# Patient Record
Sex: FEMALE | Race: WHITE | NOT HISPANIC OR LATINO | Employment: FULL TIME | URBAN - METROPOLITAN AREA
[De-identification: names, ages, dates, MRNs, and addresses within clinical notes are randomized per-mention and may not be internally consistent; named-entity substitution may affect disease eponyms.]

---

## 2018-04-05 ENCOUNTER — OFFICE VISIT (OUTPATIENT)
Dept: OBSTETRICS AND GYNECOLOGY | Facility: CLINIC | Age: 38
End: 2018-04-05

## 2018-04-05 VITALS
WEIGHT: 192 LBS | BODY MASS INDEX: 30.86 KG/M2 | SYSTOLIC BLOOD PRESSURE: 108 MMHG | DIASTOLIC BLOOD PRESSURE: 80 MMHG | HEIGHT: 66 IN

## 2018-04-05 DIAGNOSIS — Z13.9 SCREENING FOR CONDITION: ICD-10-CM

## 2018-04-05 DIAGNOSIS — Z11.51 SPECIAL SCREENING EXAMINATION FOR HUMAN PAPILLOMAVIRUS (HPV): ICD-10-CM

## 2018-04-05 DIAGNOSIS — Z01.419 PAP SMEAR, LOW-RISK: ICD-10-CM

## 2018-04-05 DIAGNOSIS — R63.5 WEIGHT GAIN: ICD-10-CM

## 2018-04-05 DIAGNOSIS — Z01.419 ENCOUNTER FOR GYNECOLOGICAL EXAMINATION WITHOUT ABNORMAL FINDING: Primary | ICD-10-CM

## 2018-04-05 LAB

## 2018-04-05 PROCEDURE — 81025 URINE PREGNANCY TEST: CPT | Performed by: OBSTETRICS & GYNECOLOGY

## 2018-04-05 PROCEDURE — 81002 URINALYSIS NONAUTO W/O SCOPE: CPT | Performed by: OBSTETRICS & GYNECOLOGY

## 2018-04-05 PROCEDURE — 99395 PREV VISIT EST AGE 18-39: CPT | Performed by: OBSTETRICS & GYNECOLOGY

## 2018-04-05 RX ORDER — MULTIVIT AND MINERALS-FERROUS GLUCONATE 9 MG IRON/15 ML ORAL LIQUID 9 MG/15 ML
LIQUID (ML) ORAL DAILY
COMMUNITY
End: 2022-09-15

## 2018-04-05 RX ORDER — VILAZODONE HYDROCHLORIDE 20 MG/1
20 TABLET ORAL DAILY
COMMUNITY
End: 2022-09-15

## 2018-04-05 NOTE — PROGRESS NOTES
"GYN Annual Exam     CC- Here for annual exam.     Richelle Sebastian is a 37 y.o. female who presents for annual well woman exam. Periods are regular every 28-30 days, lasting 5 days. Dysmenorrhea:none. Cyclic symptoms include none. No intermenstrual bleeding, spotting, or discharge.  Patient is sexually active  not currently . Patient is satisfied with her contraception no. Pt gained 30 pounds since she quit smoking over 6 weeks. Over last couple of years another 20 pounds.    OB History     No data available          Current contraception: none  History of abnormal Pap smear: yes - remote  History of abnormal mammogram: yes: breast biopsy benign  Family history of uterine, colon or ovarian cancer: no  Family history of breast cancer: yes. Sister and maternal and paternal GM. Pt's sister is BRCA negative?    Health Maintenance   Topic Date Due   • PNEUMOCOCCAL VACCINE (19-64 MEDIUM RISK) (1 of 1 - PPSV23) 11/12/1998   • TDAP/TD VACCINES (1 - Tdap) 11/12/1998   • INFLUENZA VACCINE  08/01/2018   • PAP SMEAR  04/05/2021       History reviewed. No pertinent past medical history.    No past surgical history on file.      Current Outpatient Prescriptions:   •  Multiple Vitamins-Minerals (MULTIVITAMIN AND MINERALS) liquid liquid, Take  by mouth Daily., Disp: , Rfl:   •  vilazodone (VIIBRYD) 20 MG tablet tablet, Take 20 mg by mouth Daily., Disp: , Rfl:     No Known Allergies    Social History   Substance Use Topics   • Smoking status: Current Every Day Smoker     Packs/day: 0.50     Years: 20.00   • Smokeless tobacco: Never Used   • Alcohol use No       Family History   Problem Relation Age of Onset   • Cancer Father    • Cancer Sister    • Cancer Maternal Grandmother    • Cancer Paternal Grandmother        Review of Systems    /80   Ht 167.6 cm (66\")   Wt 87.1 kg (192 lb)   LMP 03/29/2018   BMI 30.99 kg/m²     Physical Exam   Constitutional: She is oriented to person, place, and time. She appears well-developed " and well-nourished.   HENT:   Mouth/Throat: Normal dentition. No dental caries.   Cardiovascular: Normal rate and regular rhythm.    Pulmonary/Chest: Effort normal and breath sounds normal. Right breast exhibits no inverted nipple, no mass, no nipple discharge, no skin change and no tenderness. Left breast exhibits no inverted nipple, no mass, no nipple discharge, no skin change and no tenderness.   Breast reduction   Abdominal: Soft. She exhibits no distension and no mass. There is no tenderness.   Genitourinary: There is no rash, tenderness or lesion on the right labia. There is no rash, tenderness or lesion on the left labia. Uterus is not deviated, not enlarged, not fixed and not tender. Cervix exhibits no motion tenderness, no discharge and no friability. Right adnexum displays no mass, no tenderness and no fullness. Left adnexum displays no mass, no tenderness and no fullness. No tenderness or bleeding in the vagina. No vaginal discharge found.   Neurological: She is alert and oriented to person, place, and time.   Psychiatric: She has a normal mood and affect. Her behavior is normal. Judgment and thought content normal.   Vitals reviewed.         Assessment/Plan    1) GYN HM: Check pap smear   SBE demonstrated and encouraged.  2) STD screening: declines  3) Contraception: none  4) Family Planning: . Considering future children  5) Diet and Exercise discussed  6) Smoking Status: nonsmoker  7) Social: Pt's daughter is my pt: Coopermukund Krismayte  8) Weight gain: Pt's daughter diagnosed with PCOS and desires her insulin and testosterone to be checked to see if she is a candidate for Metformin- her daughter has lost 40 pounds on Metformin in last 7 months.  9) Follow up prn and 1 year       Diagnoses and all orders for this visit:    Encounter for gynecological examination without abnormal finding    Screening for condition  -     POC Urinalysis Dipstick  -     POC Pregnancy, Urine    Pap smear, low-risk  -      Pap IG, HPV-hr    Special screening examination for human papillomavirus (HPV)  -     Pap IG, HPV-hr    Weight gain  -     Testosterone, Free, Total  -     Insulin, Total    Other orders  -     Multiple Vitamins-Minerals (MULTIVITAMIN AND MINERALS) liquid liquid; Take  by mouth Daily.  -     vilazodone (VIIBRYD) 20 MG tablet tablet; Take 20 mg by mouth Daily.          Mary Byrd,   4/16/2018  9:05 PM

## 2018-04-07 LAB
INSULIN SERPL-ACNC: 9.1 UIU/ML (ref 2.6–24.9)
TESTOST FREE SERPL-MCNC: 0.8 PG/ML (ref 0–4.2)
TESTOST SERPL-MCNC: 10 NG/DL (ref 8–48)

## 2018-04-09 LAB
CYTOLOGIST CVX/VAG CYTO: NORMAL
CYTOLOGY CVX/VAG DOC THIN PREP: NORMAL
DX ICD CODE: NORMAL
HIV 1 & 2 AB SER-IMP: NORMAL
HPV I/H RISK 1 DNA CVX QL PROBE+SIG AMP: NEGATIVE
OTHER STN SPEC: NORMAL
PATH REPORT.FINAL DX SPEC: NORMAL
STAT OF ADQ CVX/VAG CYTO-IMP: NORMAL

## 2018-04-17 DIAGNOSIS — Z12.39 SCREENING BREAST EXAMINATION: Primary | ICD-10-CM

## 2019-01-16 ENCOUNTER — TELEPHONE (OUTPATIENT)
Dept: OBSTETRICS AND GYNECOLOGY | Facility: CLINIC | Age: 39
End: 2019-01-16

## 2019-01-16 NOTE — TELEPHONE ENCOUNTER
----- Message from Steph Michel sent at 1/16/2019  2:21 PM EST -----      ----- Message -----  From: Mary Byrd DO  Sent: 12/31/2018   5:17 PM  To: Mary Doe    Pt was going to get a breast MMG and Breast MRI based on her strong FHx of breast cancer. I am getting a notification from epic stating she has not done this. Is she still interested in screening?  ----- Message -----  From: SYSTEM  Sent: 10/27/2018  12:08 AM  To: Mary Byrd DO

## 2019-01-16 NOTE — TELEPHONE ENCOUNTER
PATIENT ORDER WAS SENT IN April 2018 AND PATIENT HAS NOT COMPLETED MAMMOGRAM OR 6 MNTH MRI CAN I CLOSE ORDER PHONE NUMBER IS NOW INVALID.

## 2022-09-12 ENCOUNTER — HOSPITAL ENCOUNTER (OUTPATIENT)
Facility: HOSPITAL | Age: 42
Discharge: LEFT BEFORE DISCHARGE | End: 2022-09-12

## 2022-09-12 ENCOUNTER — HOSPITAL ENCOUNTER (OUTPATIENT)
Facility: HOSPITAL | Age: 42
Discharge: HOME | End: 2022-09-12
Attending: EMERGENCY MEDICINE | Admitting: EMERGENCY MEDICINE

## 2022-09-12 VITALS
TEMPERATURE: 97.8 F | HEIGHT: 66 IN | HEART RATE: 74 BPM | DIASTOLIC BLOOD PRESSURE: 95 MMHG | WEIGHT: 180 LBS | SYSTOLIC BLOOD PRESSURE: 144 MMHG | OXYGEN SATURATION: 100 % | RESPIRATION RATE: 16 BRPM | BODY MASS INDEX: 28.93 KG/M2

## 2022-09-12 DIAGNOSIS — S00.03XA CONTUSION OF SCALP, INITIAL ENCOUNTER: ICD-10-CM

## 2022-09-12 DIAGNOSIS — S40.022A ARM CONTUSION, LEFT, INITIAL ENCOUNTER: ICD-10-CM
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
## 2022-09-12 DIAGNOSIS — S00.83XA FACIAL CONTUSION, INITIAL ENCOUNTER: ICD-10-CM

## 2022-09-12 DIAGNOSIS — S00.01XA ABRASION OF SCALP, INITIAL ENCOUNTER: ICD-10-CM

## 2022-09-12 PROCEDURE — EDLOS: Performed by: EMERGENCY MEDICINE

## 2022-09-12 PROCEDURE — 99203 OFFICE O/P NEW LOW 30 MIN: CPT | Performed by: EMERGENCY MEDICINE

## 2022-09-12 PROCEDURE — G0463 HOSPITAL OUTPT CLINIC VISIT: HCPCS | Performed by: EMERGENCY MEDICINE

## 2022-09-12 PROCEDURE — 99211 OFF/OP EST MAY X REQ PHY/QHP: CPT

## 2022-09-12 RX ORDER — LOSARTAN POTASSIUM AND HYDROCHLOROTHIAZIDE 12.5; 5 MG/1; MG/1
1 TABLET ORAL DAILY
COMMUNITY
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
## 2022-09-15 ENCOUNTER — HOSPITAL ENCOUNTER (EMERGENCY)
Facility: HOSPITAL | Age: 42
Discharge: HOME | End: 2022-09-15
Attending: EMERGENCY MEDICINE | Admitting: EMERGENCY MEDICINE

## 2022-09-15 ENCOUNTER — APPOINTMENT (OUTPATIENT)
Dept: CT IMAGING | Facility: HOSPITAL | Age: 42
End: 2022-09-15

## 2022-09-15 ENCOUNTER — APPOINTMENT (OUTPATIENT)
Dept: GENERAL RADIOLOGY | Facility: HOSPITAL | Age: 42
End: 2022-09-15

## 2022-09-15 VITALS
HEART RATE: 89 BPM | BODY MASS INDEX: 28.93 KG/M2 | HEIGHT: 66 IN | DIASTOLIC BLOOD PRESSURE: 104 MMHG | WEIGHT: 180 LBS | OXYGEN SATURATION: 97 % | SYSTOLIC BLOOD PRESSURE: 164 MMHG | RESPIRATION RATE: 18 BRPM

## 2022-09-15 DIAGNOSIS — M54.10 RADICULOPATHY, UNSPECIFIED SPINAL REGION: ICD-10-CM

## 2022-09-15 DIAGNOSIS — S06.0X1D CONCUSSION WITH LOSS OF CONSCIOUSNESS OF 30 MINUTES OR LESS, SUBSEQUENT ENCOUNTER: Primary | ICD-10-CM

## 2022-09-15 PROCEDURE — 72125 CT NECK SPINE W/O DYE: CPT

## 2022-09-15 PROCEDURE — 73630 X-RAY EXAM OF FOOT: CPT

## 2022-09-15 PROCEDURE — 73610 X-RAY EXAM OF ANKLE: CPT

## 2022-09-15 PROCEDURE — 70450 CT HEAD/BRAIN W/O DYE: CPT

## 2022-09-15 PROCEDURE — 99282 EMERGENCY DEPT VISIT SF MDM: CPT

## 2022-09-15 PROCEDURE — 73130 X-RAY EXAM OF HAND: CPT

## 2022-09-15 PROCEDURE — 99213 OFFICE O/P EST LOW 20 MIN: CPT | Performed by: EMERGENCY MEDICINE

## 2022-09-15 RX ORDER — MECLIZINE HYDROCHLORIDE 25 MG/1
25 TABLET ORAL 4 TIMES DAILY PRN
Qty: 30 TABLET | Refills: 0 | Status: SHIPPED | OUTPATIENT
Start: 2022-09-15 | End: 2022-09-15 | Stop reason: SDUPTHER

## 2022-09-15 RX ORDER — GABAPENTIN 100 MG/1
100 CAPSULE ORAL 3 TIMES DAILY
Qty: 30 CAPSULE | Refills: 0 | Status: SHIPPED | OUTPATIENT
Start: 2022-09-15 | End: 2022-09-15 | Stop reason: SDUPTHER

## 2022-09-15 RX ORDER — GABAPENTIN 100 MG/1
100 CAPSULE ORAL 3 TIMES DAILY
Qty: 30 CAPSULE | Refills: 0 | Status: SHIPPED | OUTPATIENT
Start: 2022-09-15

## 2022-09-15 RX ORDER — MECLIZINE HYDROCHLORIDE 25 MG/1
25 TABLET ORAL 4 TIMES DAILY PRN
Qty: 30 TABLET | Refills: 0 | Status: SHIPPED | OUTPATIENT
Start: 2022-09-15

## 2022-09-15 RX ORDER — ATORVASTATIN CALCIUM 20 MG/1
20 TABLET, FILM COATED ORAL DAILY
COMMUNITY

## 2022-09-15 RX ORDER — ONDANSETRON 4 MG/1
4 TABLET, ORALLY DISINTEGRATING ORAL EVERY 8 HOURS PRN
Qty: 20 TABLET | Refills: 0 | Status: SHIPPED | OUTPATIENT
Start: 2022-09-15 | End: 2022-09-15 | Stop reason: SDUPTHER

## 2022-09-15 RX ORDER — ONDANSETRON 4 MG/1
4 TABLET, ORALLY DISINTEGRATING ORAL EVERY 8 HOURS PRN
Qty: 20 TABLET | Refills: 0 | Status: SHIPPED | OUTPATIENT
Start: 2022-09-15
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
## 2022-10-13 ENCOUNTER — HOSPITAL ENCOUNTER (OUTPATIENT)
Facility: HOSPITAL | Age: 42
Discharge: HOME | End: 2022-10-13
Attending: EMERGENCY MEDICINE | Admitting: EMERGENCY MEDICINE

## 2022-10-13 VITALS
WEIGHT: 180 LBS | BODY MASS INDEX: 28.93 KG/M2 | HEART RATE: 75 BPM | DIASTOLIC BLOOD PRESSURE: 95 MMHG | TEMPERATURE: 98 F | HEIGHT: 66 IN | SYSTOLIC BLOOD PRESSURE: 132 MMHG | OXYGEN SATURATION: 97 % | RESPIRATION RATE: 16 BRPM

## 2022-10-13 DIAGNOSIS — F07.81 POST CONCUSSIVE SYNDROME: Primary | ICD-10-CM

## 2022-10-13 DIAGNOSIS — G44.329 CHRONIC POST-TRAUMATIC HEADACHE, NOT INTRACTABLE: ICD-10-CM

## 2022-10-13 PROCEDURE — G0463 HOSPITAL OUTPT CLINIC VISIT: HCPCS | Performed by: EMERGENCY MEDICINE

## 2022-10-13 PROCEDURE — 99213 OFFICE O/P EST LOW 20 MIN: CPT | Performed by: EMERGENCY MEDICINE
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
